# Patient Record
Sex: MALE | Race: WHITE
[De-identification: names, ages, dates, MRNs, and addresses within clinical notes are randomized per-mention and may not be internally consistent; named-entity substitution may affect disease eponyms.]

---

## 2019-06-28 ENCOUNTER — HOSPITAL ENCOUNTER (EMERGENCY)
Dept: HOSPITAL 80 - FEDHOLD | Age: 59
LOS: 1 days | Discharge: STILL ADMITTED OR ADMINISTRATIVE | End: 2019-06-29
Payer: COMMERCIAL

## 2021-04-28 NOTE — TELEPHONE ENCOUNTER
RECORDS RECEIVED FROM:    DATE RECEIVED: 07.23.2021   NOTES STATUS DETAILS   OFFICE NOTE from referring provider N/A    OFFICE NOTE from other specialist  Care Everywhere 03.03.2021 Malcolm Vargas MD     *Only VASCULITIS or LUPUS gather office notes for the following N/A    *PULMONARY   N/A    *ENT N/A    *DERMATOLOGY N/A    *RHEUMATOLOGY N/A    DISCHARGE SUMMARY from hospital N/A    DISCHARGE REPORT from the ER N/A    MEDICATION LIST Care Everywhere    IMAGING  (NEED IMAGES AND REPORTS)     KIDNEY CT SCAN N/A    KIDNEY ULTRASOUND N/A    MR ABDOMEN N/A    NUCLEAR MEDICINE RENAL N/A    LABS     CBC Care Everywhere 06.30.2020   CMP Care Everywhere 06.30.2020   BMP Care Everywhere 04.09.2021   UA Care Everywhere 01.04.2019   URINE PROTEIN Care Everywhere 01.04.2019   RENAL PANEL N/A    BIOPSY     KIDNEY BIOPSY  N/A

## 2021-07-22 DIAGNOSIS — R79.89 ELEVATED SERUM CREATININE: Primary | ICD-10-CM

## 2021-07-23 ENCOUNTER — PRE VISIT (OUTPATIENT)
Dept: NEPHROLOGY | Facility: CLINIC | Age: 61
End: 2021-07-23

## 2022-06-14 ENCOUNTER — DOCUMENTATION ONLY (OUTPATIENT)
Dept: ONCOLOGY | Facility: CLINIC | Age: 62
End: 2022-06-14
Payer: COMMERCIAL

## 2022-06-14 ENCOUNTER — TRANSCRIBE ORDERS (OUTPATIENT)
Dept: OTHER | Age: 62
End: 2022-06-14

## 2022-06-14 DIAGNOSIS — R91.8 RIGHT LOWER LOBE LUNG MASS: Primary | ICD-10-CM

## 2022-06-14 NOTE — PROGRESS NOTES
Action Ana 15, 2022 10:15 AM ABT   Action Taken Images from [facility] received and resolved to pACS     Action June 14, 2022 11:42 AM ABT   Action Taken Records updated in [facility] for [facility] Image and records request sent to [facility] Rad[facility] and chest images from [facility] 06/10/22: CT Chest & 05/27/22: MRA Chest.

## 2022-07-11 NOTE — PROGRESS NOTES
"LUNG NODULE & INTERVENTIONAL PULMONARY CLINIC  [facility]     Jim Villegas MRN# [id number]   Age: 62 year old YOB: 1960     Reason for Consultation: Pulmonary nodule    Requesting Physician: No referring provider defined for this encounter.       Assessment and Plan:    1.Sub 6mm nodules  Largest is 5mm in RLL. No prior CT Chest for comparison. This nodule is low risk for lung cancer but given smoking history, will recommend one further scan to ensure stability.  --Repeat CT chest in one year, if stable, no further followup needed.     Digna Og MD  Interventional Pulmonology  Department of Pulmonary, Allergy, Critical Care and Sleep Medicine   [facility]           History:     Jim Villegas is a 62 year old male with sig h/o for HTN, CKD, and aortic aneurysm who is here for pulmonary nodule.    Underwent MRI angiogram in May 2022 and was incidentally found to have a lung nodule. This was followed by a CT Chest which showed sub 6mm nodules.     - No new resp sx or complaints. Denies dyspnea or cough. Able to walk up a flight of stairs without difficulty.   - Personal hx of cancer: None  - Family hx of cancer: Mother with breast cancer, no lung cancer in family  - Tobacco hx: Quit 1988, smoked for 10 years, 1/2ppd  - My interpretation of the images relevant for this visit includes: 5mm RLL nodule, 3mm RUL nodule  - My interpretation of the PFT's relevant for this visit includes: 7/2022 normal PFTs           Past Medical History:    HTN  Aortic aneurysm  CKD         Past Surgical History:    Tonsillectomy         Social History:     Social History     Tobacco Use     Smoking status: Not on file     Smokeless tobacco: Not on file   Substance Use Topics     Alcohol use: Not on file          Allergies:   Augmentin--Hives  Simvastatin--Hives         Medications:     Reviewed in CareEverywhere         Physical Exam:   Ht 1.778 m (5' 10\")   Wt 93.8 kg " (206 lb 11.2 oz)   BMI 29.66 kg/m    Wt Readings from Last 4 Encounters:   07/21/22 93.8 kg (206 lb 11.2 oz)     General: Well appearing  Lungs: Nonlabored breathing  Neuro: Answering questions appropriately  Psych: Normal affect     Imaging/Lab Data   All laboratory and imaging data reviewed.

## 2022-07-21 ENCOUNTER — OFFICE VISIT (OUTPATIENT)
Dept: PULMONOLOGY | Facility: CLINIC | Age: 62
End: 2022-07-21
Attending: STUDENT IN AN ORGANIZED HEALTH CARE EDUCATION/TRAINING PROGRAM
Payer: COMMERCIAL

## 2022-07-21 VITALS — BODY MASS INDEX: 29.59 KG/M2 | WEIGHT: 206.7 LBS | HEIGHT: 70 IN

## 2022-07-21 DIAGNOSIS — R91.8 PULMONARY NODULES: Primary | ICD-10-CM

## 2022-07-21 DIAGNOSIS — R91.8 RIGHT LOWER LOBE LUNG MASS: ICD-10-CM

## 2022-07-21 PROCEDURE — 94726 PLETHYSMOGRAPHY LUNG VOLUMES: CPT | Performed by: INTERNAL MEDICINE

## 2022-07-21 PROCEDURE — 94729 DIFFUSING CAPACITY: CPT | Performed by: INTERNAL MEDICINE

## 2022-07-21 PROCEDURE — 99204 OFFICE O/P NEW MOD 45 MIN: CPT | Mod: 25 | Performed by: STUDENT IN AN ORGANIZED HEALTH CARE EDUCATION/TRAINING PROGRAM

## 2022-07-21 PROCEDURE — G0463 HOSPITAL OUTPT CLINIC VISIT: HCPCS

## 2022-07-21 PROCEDURE — 94375 RESPIRATORY FLOW VOLUME LOOP: CPT | Performed by: INTERNAL MEDICINE

## 2022-07-21 RX ORDER — AMLODIPINE BESYLATE 5 MG/1
5 TABLET ORAL
COMMUNITY
Start: 2021-05-21

## 2022-07-21 RX ORDER — ROSUVASTATIN CALCIUM 20 MG/1
20 TABLET, COATED ORAL
COMMUNITY
Start: 2021-05-21

## 2022-07-21 RX ORDER — METOPROLOL SUCCINATE 50 MG/1
50 TABLET, EXTENDED RELEASE ORAL
COMMUNITY
Start: 2022-06-10

## 2022-07-21 RX ORDER — EZETIMIBE 10 MG/1
10 TABLET ORAL DAILY
COMMUNITY
Start: 2021-05-21

## 2022-07-21 RX ORDER — LISINOPRIL 5 MG/1
5 TABLET ORAL 2 TIMES DAILY
COMMUNITY
Start: 2022-06-10

## 2022-07-21 NOTE — LETTER
7/21/2022       RE: Jim Villegas  [address]  [place] [address]     Dear Colleague,    Thank you for referring your patient, Jim Villegas, to the [facility] at [facility]. Please see a copy of my visit note below.    LUNG NODULE & INTERVENTIONAL PULMONARY CLINIC  [facility]     Jim Villegas MRN# [id number]   Age: 62 year old YOB: 1960     Reason for Consultation: Pulmonary nodule    Requesting Physician: No referring provider defined for this encounter.       Assessment and Plan:    1.Sub 6mm nodules  Largest is 5mm in RLL. No prior CT Chest for comparison. This nodule is low risk for lung cancer but given smoking history, will recommend one further scan to ensure stability.  --Repeat CT chest in one year, if stable, no further followup needed.     Digna Og MD  Interventional Pulmonology  Department of Pulmonary, Allergy, Critical Care and Sleep Medicine   [facility]           History:     Jim Villegas is a 62 year old male with sig h/o for HTN, CKD, and aortic aneurysm who is here for pulmonary nodule.    Underwent MRI angiogram in May 2022 and was incidentally found to have a lung nodule. This was followed by a CT Chest which showed sub 6mm nodules.     - No new resp sx or complaints. Denies dyspnea or cough. Able to walk up a flight of stairs without difficulty.   - Personal hx of cancer: None  - Family hx of cancer: Mother with breast cancer, no lung cancer in family  - Tobacco hx: Quit 1988, smoked for 10 years, 1/2ppd  - My interpretation of the images relevant for this visit includes: 5mm RLL nodule, 3mm RUL nodule  - My interpretation of the PFT's relevant for this visit includes: 7/2022 normal PFTs           Past Medical History:    HTN  Aortic aneurysm  CKD         Past Surgical History:    Tonsillectomy         Social History:     Social  "History     Tobacco Use     Smoking status: Not on file     Smokeless tobacco: Not on file   Substance Use Topics     Alcohol use: Not on file          Allergies:   Augmentin--Hives  Simvastatin--Hives         Medications:     Reviewed in CareEverywhere         Physical Exam:   Ht 1.778 m (5' 10\")   Wt 93.8 kg (206 lb 11.2 oz)   BMI 29.66 kg/m    Wt Readings from Last 4 Encounters:   07/21/22 93.8 kg (206 lb 11.2 oz)     General: Well appearing  Lungs: Nonlabored breathing  Neuro: Answering questions appropriately  Psych: Normal affect     Imaging/Lab Data   All laboratory and imaging data reviewed.                 Again, thank you for allowing me to participate in the care of your patient.      Sincerely,    Digna Og MD  "

## 2022-07-21 NOTE — NURSING NOTE
"Oncology Rooming Note    July 21, 2022 7:08 AM   Jim Villegas is a 62 year old male who presents for:    Chief Complaint   Patient presents with     Oncology Clinic Visit     Pulmonary Nodule     Initial Vitals: Ht 1.778 m (5' 10\")   Wt 93.8 kg (206 lb 11.2 oz)   BMI 29.66 kg/m   Estimated body mass index is 29.66 kg/m  as calculated from the following:    Height as of this encounter: 1.778 m (5' 10\").    Weight as of this encounter: 93.8 kg (206 lb 11.2 oz). Body surface area is 2.15 meters squared.  Data Unavailable Comment: Data Unavailable   No LMP for male patient.  Allergies reviewed: Yes  Medications reviewed: Yes    Medications: Medication refills not needed today.  Pharmacy name entered into Edgar: [id number] - [place], [place] - [address] AT [facility] [address]    Clinical concerns: Pt presents today as a new patient.       Mercedes Jha LPN  7/21/2022              "

## 2022-07-22 LAB
DLCOUNC-%PRED-PRE: 97 %
DLCOUNC-PRE: 26.18 ML/MIN/MMHG
DLCOUNC-PRED: 26.94 ML/MIN/MMHG
ERV-%PRED-PRE: 81 %
ERV-PRE: 0.83 L
ERV-PRED: 1.02 L
EXPTIME-PRE: 8.46 SEC
FEF2575-%PRED-PRE: 102 %
FEF2575-PRE: 2.83 L/SEC
FEF2575-PRED: 2.76 L/SEC
FEFMAX-%PRED-PRE: 111 %
FEFMAX-PRE: 9.94 L/SEC
FEFMAX-PRED: 8.91 L/SEC
FEV1-%PRED-PRE: 110 %
FEV1-PRE: 3.79 L
FEV1FEV6-PRE: 75 %
FEV1FEV6-PRED: 78 %
FEV1FVC-PRE: 73 %
FEV1FVC-PRED: 77 %
FEV1SVC-PRE: 72 %
FEV1SVC-PRED: 70 %
FIFMAX-PRE: 7.49 L/SEC
FRCPLETH-%PRED-PRE: 113 %
FRCPLETH-PRE: 4.12 L
FRCPLETH-PRED: 3.65 L
FVC-%PRED-PRE: 115 %
FVC-PRE: 5.16 L
FVC-PRED: 4.49 L
IC-%PRED-PRE: 114 %
IC-PRE: 4.45 L
IC-PRED: 3.88 L
RVPLETH-%PRED-PRE: 131 %
RVPLETH-PRE: 3.29 L
RVPLETH-PRED: 2.51 L
TLCPLETH-%PRED-PRE: 120 %
TLCPLETH-PRE: 8.57 L
TLCPLETH-PRED: 7.13 L
VA-%PRED-PRE: 109 %
VA-PRE: 7.13 L
VC-%PRED-PRE: 107 %
VC-PRE: 5.28 L
VC-PRED: 4.9 L

## 2023-05-17 PROBLEM — N18.30 CKD (CHRONIC KIDNEY DISEASE) STAGE 3, GFR 30-59 ML/MIN (H): Status: ACTIVE | Noted: 2021-05-21

## 2023-05-17 PROBLEM — E66.9 OBESITY: Status: ACTIVE | Noted: 2023-05-17

## 2023-05-17 PROBLEM — R73.01 IMPAIRED FASTING GLUCOSE: Status: ACTIVE | Noted: 2019-01-04

## 2023-05-17 PROBLEM — H25.13 AGE-RELATED NUCLEAR CATARACT OF BOTH EYES: Status: ACTIVE | Noted: 2019-01-04

## 2023-05-17 PROBLEM — N18.31 STAGE 3A CHRONIC KIDNEY DISEASE (H): Status: ACTIVE | Noted: 2021-09-24

## 2023-05-17 PROBLEM — Q62.11 HYDRONEPHROSIS WITH URETEROPELVIC JUNCTION (UPJ) OBSTRUCTION: Status: ACTIVE | Noted: 2020-06-29

## 2023-05-17 RX ORDER — UBIDECARENONE 100 MG
1 CAPSULE ORAL DAILY
COMMUNITY

## 2023-06-23 NOTE — PROGRESS NOTES
LUNG NODULE & INTERVENTIONAL PULMONARY CLINIC  [facility]     Jim Villegas MRN# [id number]   Age: 62 year old YOB: 1960     Reason for Consultation: pulmonary nodules    Requesting Physician: Referred MD Garret  No address on file       Assessment and Plan:    1. Sub 6mm nodules  Largest is 5mm in RLL on 6/2022 CT Chest. No prior CT Chest for comparison. Repeat CT Chest 6/2023 shows stable lung nodules. Per Fleischner guidelines for incidental nodules, sub 6mm nodules only require one year stability. No further followup needed.     Digna Og MD  Interventional Pulmonology  Department of Pulmonary, Allergy, Critical Care and Sleep Medicine   [facility]           History:     Jim Villegas is a 62 year old male with sig h/o for HTN, CKD, and aortic aneurysm who is here for pulmonary nodule.     Underwent MRI angiogram in May 2022 and was incidentally found to have a lung nodule. This was followed by a CT Chest which showed sub 6mm nodules. He is here for one year followup.    No new illnesses since last year.      - No new resp sx or complaints. Denies dyspnea or cough. Able to walk up a flight of stairs without difficulty.   - Personal hx of cancer: None  - Family hx of cancer: Mother with breast cancer, no lung cancer in family  - Tobacco hx: Quit 1988, smoked for 10 years, 1/2ppd  - My interpretation of the images relevant for this visit includes:   - My interpretation of the PFT's relevant for this visit includes: 7/2022 normal PFTs          Allergies:      Allergies   Allergen Reactions     Amoxicillin-Pot Clavulanate Hives     Simvastatin Hives, Itching and Rash          Medications:     Current Outpatient Medications   Medication Sig     amLODIPine (NORVASC) 5 MG tablet Take 5 mg by mouth     aspirin (ASA) 81 MG EC tablet Take 81 mg by mouth     cholecalciferol (VITAMIN D3) 10 mcg (400 units) TABS tablet Take 400 Units by mouth     co-enzyme  "Q-10 100 MG CAPS capsule Take 1 tablet by mouth daily     ezetimibe (ZETIA) 10 MG tablet Take 10 mg by mouth daily     lisinopril (ZESTRIL) 5 MG tablet Take 7.5 mg by mouth     metoprolol succinate ER (TOPROL XL) 50 MG 24 hr tablet Take 50 mg by mouth     rosuvastatin (CRESTOR) 20 MG tablet Take 20 mg by mouth     No current facility-administered medications for this visit.            Physical Exam:   BP (!) 166/81   Pulse 62   Temp 98.3  F (36.8  C) (Oral)   Resp 16   Ht 1.76 m (5' 9.29\")   Wt 92.1 kg (203 lb)   SpO2 98%   BMI 29.73 kg/m    Wt Readings from Last 4 Encounters:   06/29/23 92.1 kg (203 lb)   07/21/22 93.8 kg (206 lb 11.2 oz)     General: Well appearing  Lungs: Nonlabored breathing  Neuro: Answering questions appropriately  Psych: Normal affect     Imaging/Lab Data   All laboratory and imaging data reviewed.               "

## 2023-06-29 ENCOUNTER — ANCILLARY PROCEDURE (OUTPATIENT)
Dept: CT IMAGING | Facility: CLINIC | Age: 63
End: 2023-06-29
Attending: STUDENT IN AN ORGANIZED HEALTH CARE EDUCATION/TRAINING PROGRAM
Payer: COMMERCIAL

## 2023-06-29 ENCOUNTER — OFFICE VISIT (OUTPATIENT)
Dept: PULMONOLOGY | Facility: CLINIC | Age: 63
End: 2023-06-29
Attending: STUDENT IN AN ORGANIZED HEALTH CARE EDUCATION/TRAINING PROGRAM
Payer: COMMERCIAL

## 2023-06-29 VITALS
DIASTOLIC BLOOD PRESSURE: 81 MMHG | SYSTOLIC BLOOD PRESSURE: 166 MMHG | HEART RATE: 62 BPM | WEIGHT: 203 LBS | RESPIRATION RATE: 16 BRPM | OXYGEN SATURATION: 98 % | HEIGHT: 69 IN | TEMPERATURE: 98.3 F | BODY MASS INDEX: 30.07 KG/M2

## 2023-06-29 DIAGNOSIS — R91.8 PULMONARY NODULES: Primary | ICD-10-CM

## 2023-06-29 DIAGNOSIS — R91.8 PULMONARY NODULES: ICD-10-CM

## 2023-06-29 PROCEDURE — G0463 HOSPITAL OUTPT CLINIC VISIT: HCPCS | Performed by: STUDENT IN AN ORGANIZED HEALTH CARE EDUCATION/TRAINING PROGRAM

## 2023-06-29 PROCEDURE — 71250 CT THORAX DX C-: CPT | Performed by: RADIOLOGY

## 2023-06-29 PROCEDURE — 99213 OFFICE O/P EST LOW 20 MIN: CPT | Performed by: STUDENT IN AN ORGANIZED HEALTH CARE EDUCATION/TRAINING PROGRAM

## 2023-06-29 ASSESSMENT — PAIN SCALES - GENERAL: PAINLEVEL: NO PAIN (0)

## 2023-06-29 NOTE — LETTER
6/29/2023       RE: Jim Villegas  [address]  [place] [address]     Dear Colleague,    Thank you for referring your patient, Jim Villegas, to the [facility] at [facility]. Please see a copy of my visit note below.    LUNG NODULE & INTERVENTIONAL PULMONARY CLINIC  [facility]     Jim Villegas MRN# [id number]   Age: 62 year old YOB: 1960     Reason for Consultation: pulmonary nodules    Requesting Physician: Referred MD Garret  No address on file       Assessment and Plan:    1. Sub 6mm nodules  Largest is 5mm in RLL on 6/2022 CT Chest. No prior CT Chest for comparison. Repeat CT Chest 6/2023 shows stable lung nodules. Per Fleischner guidelines for incidental nodules, sub 6mm nodules only require one year stability. No further followup needed.     Digna Og MD  Interventional Pulmonology  Department of Pulmonary, Allergy, Critical Care and Sleep Medicine   [facility]           History:     Jim Villegas is a 62 year old male with sig h/o for HTN, CKD, and aortic aneurysm who is here for pulmonary nodule.     Underwent MRI angiogram in May 2022 and was incidentally found to have a lung nodule. This was followed by a CT Chest which showed sub 6mm nodules. He is here for one year followup.    No new illnesses since last year.      - No new resp sx or complaints. Denies dyspnea or cough. Able to walk up a flight of stairs without difficulty.   - Personal hx of cancer: None  - Family hx of cancer: Mother with breast cancer, no lung cancer in family  - Tobacco hx: Quit 1988, smoked for 10 years, 1/2ppd  - My interpretation of the images relevant for this visit includes:   - My interpretation of the PFT's relevant for this visit includes: 7/2022 normal PFTs          Allergies:      Allergies   Allergen Reactions    Amoxicillin-Pot Clavulanate Hives    Simvastatin  "Hives, Itching and Rash          Medications:     Current Outpatient Medications   Medication Sig    amLODIPine (NORVASC) 5 MG tablet Take 5 mg by mouth    aspirin (ASA) 81 MG EC tablet Take 81 mg by mouth    cholecalciferol (VITAMIN D3) 10 mcg (400 units) TABS tablet Take 400 Units by mouth    co-enzyme Q-10 100 MG CAPS capsule Take 1 tablet by mouth daily    ezetimibe (ZETIA) 10 MG tablet Take 10 mg by mouth daily    lisinopril (ZESTRIL) 5 MG tablet Take 7.5 mg by mouth    metoprolol succinate ER (TOPROL XL) 50 MG 24 hr tablet Take 50 mg by mouth    rosuvastatin (CRESTOR) 20 MG tablet Take 20 mg by mouth     No current facility-administered medications for this visit.            Physical Exam:   BP (!) 166/81   Pulse 62   Temp 98.3  F (36.8  C) (Oral)   Resp 16   Ht 1.76 m (5' 9.29\")   Wt 92.1 kg (203 lb)   SpO2 98%   BMI 29.73 kg/m    Wt Readings from Last 4 Encounters:   06/29/23 92.1 kg (203 lb)   07/21/22 93.8 kg (206 lb 11.2 oz)     General: Well appearing  Lungs: Nonlabored breathing  Neuro: Answering questions appropriately  Psych: Normal affect     Imaging/Lab Data   All laboratory and imaging data reviewed.           Sincerely,    Digna Og MD      "

## 2023-06-29 NOTE — NURSING NOTE
"Oncology Rooming Note    June 29, 2023 10:43 AM   Jim Villegas is a 62 year old male who presents for:    Chief Complaint   Patient presents with     Oncology Clinic Visit     Pulmonary nodules     Initial Vitals: BP (!) 166/81   Pulse 62   Temp 98.3  F (36.8  C) (Oral)   Resp 16   Ht 1.76 m (5' 9.29\")   Wt 92.1 kg (203 lb)   SpO2 98%   BMI 29.73 kg/m   Estimated body mass index is 29.73 kg/m  as calculated from the following:    Height as of this encounter: 1.76 m (5' 9.29\").    Weight as of this encounter: 92.1 kg (203 lb). Body surface area is 2.12 meters squared.  No Pain (0) Comment: Data Unavailable   No LMP for male patient.  Allergies reviewed: Yes  Medications reviewed: Yes    Medications: Medication refills not needed today.  Pharmacy name entered into [employer]: [id number] - [place], [place] - [address] AT [facility] [address]    Clinical concerns:        Floridalma Walker CMA              "